# Patient Record
Sex: MALE | Race: WHITE | NOT HISPANIC OR LATINO
[De-identification: names, ages, dates, MRNs, and addresses within clinical notes are randomized per-mention and may not be internally consistent; named-entity substitution may affect disease eponyms.]

---

## 2017-01-13 ENCOUNTER — APPOINTMENT (OUTPATIENT)
Dept: INTERNAL MEDICINE | Facility: CLINIC | Age: 27
End: 2017-01-13

## 2017-01-13 VITALS
DIASTOLIC BLOOD PRESSURE: 72 MMHG | TEMPERATURE: 95.8 F | SYSTOLIC BLOOD PRESSURE: 118 MMHG | HEART RATE: 84 BPM | RESPIRATION RATE: 14 BRPM

## 2017-01-13 VITALS — HEIGHT: 65 IN | BODY MASS INDEX: 25.99 KG/M2 | WEIGHT: 156 LBS

## 2017-01-13 DIAGNOSIS — Z78.9 OTHER SPECIFIED HEALTH STATUS: ICD-10-CM

## 2017-01-13 DIAGNOSIS — Z83.2 FAMILY HISTORY OF DISEASES OF THE BLOOD AND BLOOD-FORMING ORGANS AND CERTAIN DISORDERS INVOLVING THE IMMUNE MECHANISM: ICD-10-CM

## 2017-01-13 DIAGNOSIS — Z86.39 PERSONAL HISTORY OF OTHER ENDOCRINE, NUTRITIONAL AND METABOLIC DISEASE: ICD-10-CM

## 2017-01-13 RX ORDER — MELOXICAM 15 MG/1
15 TABLET ORAL
Qty: 30 | Refills: 2 | Status: ACTIVE | COMMUNITY
Start: 2017-01-13 | End: 1900-01-01

## 2017-01-18 ENCOUNTER — APPOINTMENT (OUTPATIENT)
Dept: INTERNAL MEDICINE | Facility: CLINIC | Age: 27
End: 2017-01-18

## 2017-01-18 DIAGNOSIS — E78.2 MIXED HYPERLIPIDEMIA: ICD-10-CM

## 2017-01-18 DIAGNOSIS — E55.9 VITAMIN D DEFICIENCY, UNSPECIFIED: ICD-10-CM

## 2017-01-18 DIAGNOSIS — Z00.00 ENCOUNTER FOR GENERAL ADULT MEDICAL EXAMINATION W/OUT ABNORMAL FINDINGS: ICD-10-CM

## 2017-01-21 LAB
25(OH)D3 SERPL-MCNC: 35.9 NG/ML
ALBUMIN SERPL ELPH-MCNC: 4.7 G/DL
ALP BLD-CCNC: 46 U/L
ALT SERPL-CCNC: 24 U/L
ANION GAP SERPL CALC-SCNC: 14 MMOL/L
AST SERPL-CCNC: 20 U/L
BASOPHILS # BLD AUTO: 0.02 K/UL
BASOPHILS NFR BLD AUTO: 0.3 %
BILIRUB SERPL-MCNC: 0.4 MG/DL
BUN SERPL-MCNC: 19 MG/DL
CALCIUM SERPL-MCNC: 10.1 MG/DL
CHLORIDE SERPL-SCNC: 101 MMOL/L
CHOLEST SERPL-MCNC: 191 MG/DL
CHOLEST/HDLC SERPL: 3.5 RATIO
CO2 SERPL-SCNC: 27 MMOL/L
CREAT SERPL-MCNC: 1.12 MG/DL
EOSINOPHIL # BLD AUTO: 0.19 K/UL
EOSINOPHIL NFR BLD AUTO: 2.8 %
GLUCOSE SERPL-MCNC: 80 MG/DL
HCT VFR BLD CALC: 39.9 %
HDLC SERPL-MCNC: 54 MG/DL
HGB BLD-MCNC: 13.2 G/DL
IMM GRANULOCYTES NFR BLD AUTO: 0.1 %
LDLC SERPL CALC-MCNC: 125 MG/DL
LYMPHOCYTES # BLD AUTO: 2.61 K/UL
LYMPHOCYTES NFR BLD AUTO: 37.9 %
MAN DIFF?: NORMAL
MCHC RBC-ENTMCNC: 29.4 PG
MCHC RBC-ENTMCNC: 33.1 GM/DL
MCV RBC AUTO: 88.9 FL
MONOCYTES # BLD AUTO: 0.67 K/UL
MONOCYTES NFR BLD AUTO: 9.7 %
NEUTROPHILS # BLD AUTO: 3.39 K/UL
NEUTROPHILS NFR BLD AUTO: 49.2 %
PLATELET # BLD AUTO: 276 K/UL
POTASSIUM SERPL-SCNC: 4.4 MMOL/L
PROT SERPL-MCNC: 7.9 G/DL
RBC # BLD: 4.49 M/UL
RBC # FLD: 13.6 %
SODIUM SERPL-SCNC: 142 MMOL/L
TRIGL SERPL-MCNC: 59 MG/DL
WBC # FLD AUTO: 6.89 K/UL

## 2018-04-13 ENCOUNTER — APPOINTMENT (OUTPATIENT)
Dept: INTERNAL MEDICINE | Facility: CLINIC | Age: 28
End: 2018-04-13
Payer: COMMERCIAL

## 2018-04-13 VITALS
HEART RATE: 88 BPM | DIASTOLIC BLOOD PRESSURE: 72 MMHG | RESPIRATION RATE: 14 BRPM | SYSTOLIC BLOOD PRESSURE: 120 MMHG | TEMPERATURE: 99.3 F | WEIGHT: 150 LBS

## 2018-04-13 DIAGNOSIS — J02.0 STREPTOCOCCAL PHARYNGITIS: ICD-10-CM

## 2018-04-13 PROCEDURE — 99213 OFFICE O/P EST LOW 20 MIN: CPT

## 2018-04-13 RX ORDER — AZITHROMYCIN 250 MG/1
250 TABLET, FILM COATED ORAL
Qty: 6 | Refills: 0 | Status: ACTIVE | COMMUNITY
Start: 2018-04-13 | End: 1900-01-01

## 2018-04-17 LAB — BACTERIA THROAT CULT: NORMAL

## 2018-06-05 ENCOUNTER — EMERGENCY (EMERGENCY)
Facility: HOSPITAL | Age: 28
LOS: 1 days | Discharge: HOME | End: 2018-06-05
Attending: EMERGENCY MEDICINE
Payer: COMMERCIAL

## 2018-06-05 VITALS
TEMPERATURE: 98 F | RESPIRATION RATE: 16 BRPM | SYSTOLIC BLOOD PRESSURE: 135 MMHG | DIASTOLIC BLOOD PRESSURE: 88 MMHG | OXYGEN SATURATION: 100 % | HEART RATE: 52 BPM | WEIGHT: 154.98 LBS | HEIGHT: 66 IN

## 2018-06-05 LAB
APPEARANCE UR: CLEAR — SIGNIFICANT CHANGE UP
BILIRUB UR-MCNC: NEGATIVE — SIGNIFICANT CHANGE UP
COLOR SPEC: COLORLESS — SIGNIFICANT CHANGE UP
DIFF PNL FLD: ABNORMAL
GLUCOSE UR QL: NEGATIVE — SIGNIFICANT CHANGE UP
KETONES UR-MCNC: NEGATIVE — SIGNIFICANT CHANGE UP
LEUKOCYTE ESTERASE UR-ACNC: NEGATIVE — SIGNIFICANT CHANGE UP
NITRITE UR-MCNC: NEGATIVE — SIGNIFICANT CHANGE UP
PH UR: 7.5 — SIGNIFICANT CHANGE UP (ref 5–8)
PROT UR-MCNC: NEGATIVE — SIGNIFICANT CHANGE UP
RBC CASTS # UR COMP ASSIST: SIGNIFICANT CHANGE UP /HPF (ref 0–2)
SP GR SPEC: 1 — LOW (ref 1.01–1.02)
UROBILINOGEN FLD QL: NEGATIVE — SIGNIFICANT CHANGE UP
WBC UR QL: SIGNIFICANT CHANGE UP /HPF (ref 0–5)

## 2018-06-05 PROCEDURE — 99284 EMERGENCY DEPT VISIT MOD MDM: CPT

## 2018-06-05 PROCEDURE — 76770 US EXAM ABDO BACK WALL COMP: CPT | Mod: 26

## 2018-06-05 RX ORDER — ACETAMINOPHEN 500 MG
1000 TABLET ORAL ONCE
Qty: 0 | Refills: 0 | Status: COMPLETED | OUTPATIENT
Start: 2018-06-05 | End: 2018-06-05

## 2018-06-05 RX ORDER — SODIUM CHLORIDE 9 MG/ML
1000 INJECTION INTRAMUSCULAR; INTRAVENOUS; SUBCUTANEOUS ONCE
Qty: 0 | Refills: 0 | Status: COMPLETED | OUTPATIENT
Start: 2018-06-05 | End: 2018-06-05

## 2018-06-05 RX ADMIN — Medication 400 MILLIGRAM(S): at 23:51

## 2018-06-05 RX ADMIN — SODIUM CHLORIDE 1000 MILLILITER(S): 9 INJECTION INTRAMUSCULAR; INTRAVENOUS; SUBCUTANEOUS at 23:52

## 2018-06-05 NOTE — ED PROVIDER NOTE - PLAN OF CARE
Take motrin 600mg every 6- 8 hours as needed for pain. Or take Tylenol 975mg every 4-6 hours as needed for pain. Follow up with your primary care doctor in the next 1-2 days. Make an appt to be seen by the urologist. Return for any fever, chills, worsening pain.

## 2018-06-05 NOTE — ED PROVIDER NOTE - PROGRESS NOTE DETAILS
Dr. Avila: Pt signed out to me pending labs, suspected renal stone. Sono shows mild L hydro. Had hematuria. Dr. Avila: Pt signed out to me pending labs, suspected renal stone. Sono shows mild L hydro. Had hematuria earlier today. UA not concerning for infection. Dr. Avila: Results of labs and imaging reviewed with pt and parents at bedside. Feeling better. Advised to take motrin and or tylenol as needed for pain. Follow up with urology. Return precautions reviewed.

## 2018-06-05 NOTE — ED ADULT NURSE NOTE - OBJECTIVE STATEMENT
Pt had flank and back pain since this afternoon, he went to an urgicenter and was found to have blood in his urine.  He denies abimbola blood in his urine, fevers, vomiting or diarrhea.  Did have some nausea.

## 2018-06-05 NOTE — ED PROVIDER NOTE - ATTENDING CONTRIBUTION TO CARE
Clarissa Rubio MD - Attending Physician: I have personally seen and examined this patient with the resident/fellow.  I have fully participated in the care of this patient. I have reviewed all pertinent clinical information, including history, physical exam, plan and the Resident/Fellow’s note and agree except as noted. See MDM Clarissa Rubio MD - Attending Physician: I have personally seen and examined this patient. I have discussed the case with the ACP. I have reviewed all pertinent clinical information, including history, physical exam, plan and the ACP’s note and agree except as noted. See MDM

## 2018-06-05 NOTE — ED PROVIDER NOTE - CARE PLAN
Principal Discharge DX:	Flank pain  Assessment and plan of treatment:	Take motrin 600mg every 6- 8 hours as needed for pain. Or take Tylenol 975mg every 4-6 hours as needed for pain. Follow up with your primary care doctor in the next 1-2 days. Make an appt to be seen by the urologist. Return for any fever, chills, worsening pain.  Secondary Diagnosis:	Renal colic on left side

## 2018-06-05 NOTE — ED PROVIDER NOTE - MEDICAL DECISION MAKING DETAILS
Clarissa Rubio MD - Attending Physician: Pt here with flank pain, noted blood on UA at . C/w renal stone; however, pt very comfortable appearing now, exam normal and nonfocal. US for hydro, labs, Ua for eval

## 2018-06-05 NOTE — ED ADULT NURSE NOTE - CHPI ED SYMPTOMS NEG
no abdominal distension/no diarrhea/no dysuria/no fever/no blood in stool/no vomiting/no burning urination/no chills

## 2018-06-05 NOTE — ED PROVIDER NOTE - OBJECTIVE STATEMENT
26 yo male with no PMHx p/w left flank pain.  The patient reports that he developed sharp intermittent left flank pain this AM.  He took advil and pain improved.  Around 4pm pain returned.  Patient went to urgent care and UA was + for blood so he was sent to the ER.  Patient denies fevers/chills, CP, SOB, abd pain, NVDC, but does admit to urinary frequency.

## 2018-06-06 VITALS
HEART RATE: 67 BPM | OXYGEN SATURATION: 95 % | RESPIRATION RATE: 16 BRPM | SYSTOLIC BLOOD PRESSURE: 123 MMHG | DIASTOLIC BLOOD PRESSURE: 88 MMHG

## 2018-06-06 LAB
ALBUMIN SERPL ELPH-MCNC: 4.8 G/DL — SIGNIFICANT CHANGE UP (ref 3.3–5)
ALP SERPL-CCNC: 47 U/L — SIGNIFICANT CHANGE UP (ref 40–120)
ALT FLD-CCNC: 21 U/L — SIGNIFICANT CHANGE UP (ref 10–45)
ANION GAP SERPL CALC-SCNC: 13 MMOL/L — SIGNIFICANT CHANGE UP (ref 5–17)
AST SERPL-CCNC: 28 U/L — SIGNIFICANT CHANGE UP (ref 10–40)
BASE EXCESS BLDV CALC-SCNC: 5.1 MMOL/L — HIGH (ref -2–2)
BASOPHILS # BLD AUTO: 0.1 K/UL — SIGNIFICANT CHANGE UP (ref 0–0.2)
BASOPHILS NFR BLD AUTO: 1.3 % — SIGNIFICANT CHANGE UP (ref 0–2)
BILIRUB SERPL-MCNC: 0.5 MG/DL — SIGNIFICANT CHANGE UP (ref 0.2–1.2)
BUN SERPL-MCNC: 10 MG/DL — SIGNIFICANT CHANGE UP (ref 7–23)
CA-I SERPL-SCNC: 1.32 MMOL/L — HIGH (ref 1.12–1.3)
CALCIUM SERPL-MCNC: 10.6 MG/DL — HIGH (ref 8.4–10.5)
CHLORIDE BLDV-SCNC: 102 MMOL/L — SIGNIFICANT CHANGE UP (ref 96–108)
CHLORIDE SERPL-SCNC: 99 MMOL/L — SIGNIFICANT CHANGE UP (ref 96–108)
CO2 BLDV-SCNC: 33 MMOL/L — HIGH (ref 22–30)
CO2 SERPL-SCNC: 27 MMOL/L — SIGNIFICANT CHANGE UP (ref 22–31)
CREAT SERPL-MCNC: 1 MG/DL — SIGNIFICANT CHANGE UP (ref 0.5–1.3)
EOSINOPHIL # BLD AUTO: 0.2 K/UL — SIGNIFICANT CHANGE UP (ref 0–0.5)
EOSINOPHIL NFR BLD AUTO: 2.5 % — SIGNIFICANT CHANGE UP (ref 0–6)
GAS PNL BLDV: 140 MMOL/L — SIGNIFICANT CHANGE UP (ref 136–145)
GAS PNL BLDV: SIGNIFICANT CHANGE UP
GLUCOSE BLDV-MCNC: 93 MG/DL — SIGNIFICANT CHANGE UP (ref 70–99)
GLUCOSE SERPL-MCNC: 91 MG/DL — SIGNIFICANT CHANGE UP (ref 70–99)
HCO3 BLDV-SCNC: 31 MMOL/L — HIGH (ref 21–29)
HCT VFR BLD CALC: 38.3 % — LOW (ref 39–50)
HCT VFR BLDA CALC: 41 % — SIGNIFICANT CHANGE UP (ref 39–50)
HGB BLD CALC-MCNC: 13.3 G/DL — SIGNIFICANT CHANGE UP (ref 13–17)
HGB BLD-MCNC: 13.6 G/DL — SIGNIFICANT CHANGE UP (ref 13–17)
LACTATE BLDV-MCNC: 1.2 MMOL/L — SIGNIFICANT CHANGE UP (ref 0.7–2)
LYMPHOCYTES # BLD AUTO: 2.4 K/UL — SIGNIFICANT CHANGE UP (ref 1–3.3)
LYMPHOCYTES # BLD AUTO: 33.7 % — SIGNIFICANT CHANGE UP (ref 13–44)
MCHC RBC-ENTMCNC: 31.4 PG — SIGNIFICANT CHANGE UP (ref 27–34)
MCHC RBC-ENTMCNC: 35.5 GM/DL — SIGNIFICANT CHANGE UP (ref 32–36)
MCV RBC AUTO: 88.4 FL — SIGNIFICANT CHANGE UP (ref 80–100)
MONOCYTES # BLD AUTO: 0.5 K/UL — SIGNIFICANT CHANGE UP (ref 0–0.9)
MONOCYTES NFR BLD AUTO: 7.7 % — SIGNIFICANT CHANGE UP (ref 2–14)
NEUTROPHILS # BLD AUTO: 3.8 K/UL — SIGNIFICANT CHANGE UP (ref 1.8–7.4)
NEUTROPHILS NFR BLD AUTO: 54.7 % — SIGNIFICANT CHANGE UP (ref 43–77)
OTHER CELLS CSF MANUAL: 10 ML/DL — LOW (ref 18–22)
PCO2 BLDV: 53 MMHG — HIGH (ref 35–50)
PH BLDV: 7.38 — SIGNIFICANT CHANGE UP (ref 7.35–7.45)
PLATELET # BLD AUTO: 273 K/UL — SIGNIFICANT CHANGE UP (ref 150–400)
PO2 BLDV: 32 MMHG — SIGNIFICANT CHANGE UP (ref 25–45)
POTASSIUM BLDV-SCNC: 3.8 MMOL/L — SIGNIFICANT CHANGE UP (ref 3.5–5.3)
POTASSIUM SERPL-MCNC: 4.1 MMOL/L — SIGNIFICANT CHANGE UP (ref 3.5–5.3)
POTASSIUM SERPL-SCNC: 4.1 MMOL/L — SIGNIFICANT CHANGE UP (ref 3.5–5.3)
PROT SERPL-MCNC: 8.1 G/DL — SIGNIFICANT CHANGE UP (ref 6–8.3)
RBC # BLD: 4.33 M/UL — SIGNIFICANT CHANGE UP (ref 4.2–5.8)
RBC # FLD: 11.7 % — SIGNIFICANT CHANGE UP (ref 10.3–14.5)
SAO2 % BLDV: 56 % — LOW (ref 67–88)
SODIUM SERPL-SCNC: 139 MMOL/L — SIGNIFICANT CHANGE UP (ref 135–145)
WBC # BLD: 7 K/UL — SIGNIFICANT CHANGE UP (ref 3.8–10.5)
WBC # FLD AUTO: 7 K/UL — SIGNIFICANT CHANGE UP (ref 3.8–10.5)

## 2018-06-06 PROCEDURE — 96374 THER/PROPH/DIAG INJ IV PUSH: CPT

## 2018-06-06 PROCEDURE — 82947 ASSAY GLUCOSE BLOOD QUANT: CPT

## 2018-06-06 PROCEDURE — 76770 US EXAM ABDO BACK WALL COMP: CPT

## 2018-06-06 PROCEDURE — 85027 COMPLETE CBC AUTOMATED: CPT

## 2018-06-06 PROCEDURE — 83605 ASSAY OF LACTIC ACID: CPT

## 2018-06-06 PROCEDURE — 82330 ASSAY OF CALCIUM: CPT

## 2018-06-06 PROCEDURE — 80053 COMPREHEN METABOLIC PANEL: CPT

## 2018-06-06 PROCEDURE — 84295 ASSAY OF SERUM SODIUM: CPT

## 2018-06-06 PROCEDURE — 84132 ASSAY OF SERUM POTASSIUM: CPT

## 2018-06-06 PROCEDURE — 87086 URINE CULTURE/COLONY COUNT: CPT

## 2018-06-06 PROCEDURE — 82803 BLOOD GASES ANY COMBINATION: CPT

## 2018-06-06 PROCEDURE — 81001 URINALYSIS AUTO W/SCOPE: CPT

## 2018-06-06 PROCEDURE — 99284 EMERGENCY DEPT VISIT MOD MDM: CPT | Mod: 25

## 2018-06-06 PROCEDURE — 85014 HEMATOCRIT: CPT

## 2018-06-06 PROCEDURE — 82435 ASSAY OF BLOOD CHLORIDE: CPT

## 2018-06-06 RX ADMIN — Medication 1000 MILLIGRAM(S): at 00:45

## 2018-06-07 ENCOUNTER — APPOINTMENT (OUTPATIENT)
Dept: INTERNAL MEDICINE | Facility: CLINIC | Age: 28
End: 2018-06-07

## 2018-06-07 LAB
CULTURE RESULTS: NO GROWTH — SIGNIFICANT CHANGE UP
SPECIMEN SOURCE: SIGNIFICANT CHANGE UP

## 2018-06-11 ENCOUNTER — OUTPATIENT (OUTPATIENT)
Dept: OUTPATIENT SERVICES | Facility: HOSPITAL | Age: 28
LOS: 1 days | End: 2018-06-11
Payer: COMMERCIAL

## 2018-06-11 ENCOUNTER — APPOINTMENT (OUTPATIENT)
Dept: UROLOGY | Facility: CLINIC | Age: 28
End: 2018-06-11
Payer: COMMERCIAL

## 2018-06-11 VITALS
SYSTOLIC BLOOD PRESSURE: 115 MMHG | BODY MASS INDEX: 24.91 KG/M2 | HEART RATE: 52 BPM | TEMPERATURE: 98.2 F | DIASTOLIC BLOOD PRESSURE: 75 MMHG | WEIGHT: 155 LBS | HEIGHT: 66 IN | RESPIRATION RATE: 16 BRPM

## 2018-06-11 DIAGNOSIS — N13.30 UNSPECIFIED HYDRONEPHROSIS: ICD-10-CM

## 2018-06-11 DIAGNOSIS — N20.0 CALCULUS OF KIDNEY: ICD-10-CM

## 2018-06-11 PROCEDURE — 76775 US EXAM ABDO BACK WALL LIM: CPT

## 2018-06-11 PROCEDURE — 99244 OFF/OP CNSLTJ NEW/EST MOD 40: CPT | Mod: 25

## 2018-06-11 PROCEDURE — 76775 US EXAM ABDO BACK WALL LIM: CPT | Mod: 26

## 2018-06-12 DIAGNOSIS — N13.30 UNSPECIFIED HYDRONEPHROSIS: ICD-10-CM

## 2018-06-12 DIAGNOSIS — N20.0 CALCULUS OF KIDNEY: ICD-10-CM

## 2018-06-16 LAB
APPEARANCE: CLEAR
BACTERIA UR CULT: NORMAL
BACTERIA: NEGATIVE
BILIRUBIN URINE: NEGATIVE
BLOOD URINE: NEGATIVE
COLOR: YELLOW
CORE LAB FLUID CYTOLOGY: NORMAL
GLUCOSE QUALITATIVE U: NEGATIVE MG/DL
KETONES URINE: NEGATIVE
LEUKOCYTE ESTERASE URINE: NEGATIVE
MICROSCOPIC-UA: NORMAL
NITRITE URINE: NEGATIVE
PH URINE: 6.5
PROTEIN URINE: NEGATIVE MG/DL
RED BLOOD CELLS URINE: 0 /HPF
SPECIFIC GRAVITY URINE: 1.01
SQUAMOUS EPITHELIAL CELLS: 0 /HPF
UROBILINOGEN URINE: NEGATIVE MG/DL
WHITE BLOOD CELLS URINE: 0 /HPF

## 2018-09-10 ENCOUNTER — APPOINTMENT (OUTPATIENT)
Dept: UROLOGY | Facility: CLINIC | Age: 28
End: 2018-09-10

## 2020-12-16 PROBLEM — J02.0 PHARYNGITIS DUE TO STREPTOCOCCUS SPECIES: Status: RESOLVED | Noted: 2018-04-13 | Resolved: 2020-12-16

## 2020-12-30 ENCOUNTER — EMERGENCY (EMERGENCY)
Facility: HOSPITAL | Age: 30
LOS: 1 days | Discharge: HOME | End: 2020-12-30
Admitting: EMERGENCY MEDICINE
Payer: COMMERCIAL

## 2020-12-30 VITALS
TEMPERATURE: 99 F | HEIGHT: 66 IN | HEART RATE: 77 BPM | DIASTOLIC BLOOD PRESSURE: 76 MMHG | OXYGEN SATURATION: 96 % | SYSTOLIC BLOOD PRESSURE: 123 MMHG | RESPIRATION RATE: 18 BRPM

## 2020-12-30 DIAGNOSIS — Z20.828 CONTACT WITH AND (SUSPECTED) EXPOSURE TO OTHER VIRAL COMMUNICABLE DISEASES: ICD-10-CM

## 2020-12-30 PROCEDURE — U0003: CPT

## 2020-12-30 PROCEDURE — 99283 EMERGENCY DEPT VISIT LOW MDM: CPT

## 2020-12-30 NOTE — ED PROVIDER NOTE - NSFOLLOWUPINSTRUCTIONS_ED_ALL_ED_FT
You will received a text or email with your covid swab results within 24-48 hours.  You can also call back the number [email or web address] on discharge papers for results or access patient portal.    If you have symptoms of covid 19 or were exposed you should quarantine for 14 days minimum or until symptoms resolve  If you have difficulty breathing, chest pain, dizziness, fainting, vomiting or other concerns return to ED

## 2020-12-30 NOTE — ED PROVIDER NOTE - PATIENT PORTAL LINK FT
You can access the FollowMyHealth Patient Portal offered by [facility] by registering at the following website: [email or web address]. By joining [employer]’s FollowMyHealth portal, you will also be able to view your health information using other applications (apps) compatible with our system.

## 2020-12-31 LAB — SARS-COV-2 RNA SPEC QL NAA+PROBE: SIGNIFICANT CHANGE UP

## 2022-06-20 NOTE — ED ADULT TRIAGE NOTE - WEIGHT IN KG
Post-Op Assessment Note    CV Status:  Stable  Pain Score: 1    Pain management: adequate     Mental Status:  Alert and awake   Hydration Status:  Euvolemic   PONV Controlled:  Controlled   Airway Patency:  Patent      Post Op Vitals Reviewed: Yes      Staff: Anesthesiologist         No complications documented      /63 (06/20/22 1216)    Temp (!) 97 2 °F (36 2 °C) (06/20/22 1216)    Pulse 66 (06/20/22 1216)   Resp 13 (06/20/22 1216)    SpO2 95 % (06/20/22 1216)
70.3

## 2022-07-15 ENCOUNTER — OUTPATIENT (OUTPATIENT)
Dept: OUTPATIENT SERVICES | Facility: HOSPITAL | Age: 32
LOS: 1 days | End: 2022-07-15
Payer: COMMERCIAL

## 2022-07-15 DIAGNOSIS — Z11.52 ENCOUNTER FOR SCREENING FOR COVID-19: ICD-10-CM

## 2022-07-15 LAB — SARS-COV-2 RNA SPEC QL NAA+PROBE: SIGNIFICANT CHANGE UP

## 2022-07-15 PROCEDURE — U0005: CPT

## 2022-07-15 PROCEDURE — U0003: CPT

## 2022-07-15 PROCEDURE — C9803: CPT

## 2022-09-01 NOTE — ED PROVIDER NOTE - PMH
Keep dental and pcp/psych appointments as scheduled. Follow up with us prn. Tylenol/motrin for pain.   No pertinent past medical history

## 2023-08-07 ENCOUNTER — NON-APPOINTMENT (OUTPATIENT)
Age: 33
End: 2023-08-07

## 2023-10-03 ENCOUNTER — NON-APPOINTMENT (OUTPATIENT)
Age: 33
End: 2023-10-03

## 2023-10-03 ENCOUNTER — APPOINTMENT (OUTPATIENT)
Dept: INTERNAL MEDICINE | Facility: CLINIC | Age: 33
End: 2023-10-03
Payer: COMMERCIAL

## 2023-10-03 VITALS
WEIGHT: 168.31 LBS | OXYGEN SATURATION: 97 % | HEART RATE: 66 BPM | TEMPERATURE: 97.5 F | RESPIRATION RATE: 16 BRPM | SYSTOLIC BLOOD PRESSURE: 118 MMHG | HEIGHT: 66 IN | BODY MASS INDEX: 27.05 KG/M2 | DIASTOLIC BLOOD PRESSURE: 83 MMHG

## 2023-10-03 DIAGNOSIS — Z00.00 ENCOUNTER FOR GENERAL ADULT MEDICAL EXAMINATION W/OUT ABNORMAL FINDINGS: ICD-10-CM

## 2023-10-03 DIAGNOSIS — Z77.011 CONTACT WITH AND (SUSPECTED) EXPOSURE TO LEAD: ICD-10-CM

## 2023-10-03 PROCEDURE — 36415 COLL VENOUS BLD VENIPUNCTURE: CPT

## 2023-10-03 PROCEDURE — 99385 PREV VISIT NEW AGE 18-39: CPT | Mod: 25

## 2023-10-05 ENCOUNTER — TRANSCRIPTION ENCOUNTER (OUTPATIENT)
Age: 33
End: 2023-10-05

## 2023-10-10 ENCOUNTER — NON-APPOINTMENT (OUTPATIENT)
Age: 33
End: 2023-10-10

## 2023-10-10 LAB
25(OH)D3 SERPL-MCNC: 32.4 NG/ML
ALBUMIN SERPL ELPH-MCNC: 5 G/DL
ALP BLD-CCNC: 48 U/L
ALT SERPL-CCNC: 30 U/L
ANION GAP SERPL CALC-SCNC: 11 MMOL/L
APPEARANCE: CLEAR
AST SERPL-CCNC: 23 U/L
BASOPHILS # BLD AUTO: 0.05 K/UL
BASOPHILS NFR BLD AUTO: 1 %
BILIRUB SERPL-MCNC: 0.5 MG/DL
BILIRUBIN URINE: NEGATIVE
BLOOD URINE: NEGATIVE
BUN SERPL-MCNC: 16 MG/DL
CALCIUM SERPL-MCNC: 10.3 MG/DL
CHLORIDE SERPL-SCNC: 102 MMOL/L
CHOLEST SERPL-MCNC: 204 MG/DL
CO2 SERPL-SCNC: 28 MMOL/L
COLOR: YELLOW
CREAT SERPL-MCNC: 1.01 MG/DL
EGFR: 103 ML/MIN/1.73M2
EOSINOPHIL # BLD AUTO: 0.16 K/UL
EOSINOPHIL NFR BLD AUTO: 3.2 %
ESTIMATED AVERAGE GLUCOSE: 100 MG/DL
GLUCOSE QUALITATIVE U: NEGATIVE MG/DL
GLUCOSE SERPL-MCNC: 92 MG/DL
HBA1C MFR BLD HPLC: 5.1 %
HCT VFR BLD CALC: 39.5 %
HDLC SERPL-MCNC: 51 MG/DL
HGB BLD-MCNC: 13.2 G/DL
IMM GRANULOCYTES NFR BLD AUTO: 0.4 %
KETONES URINE: NEGATIVE MG/DL
LDLC SERPL CALC-MCNC: 142 MG/DL
LEAD BLD-MCNC: 1.4 UG/DL
LEUKOCYTE ESTERASE URINE: NEGATIVE
LYMPHOCYTES # BLD AUTO: 1.6 K/UL
LYMPHOCYTES NFR BLD AUTO: 32 %
MAN DIFF?: NORMAL
MCHC RBC-ENTMCNC: 28.4 PG
MCHC RBC-ENTMCNC: 33.4 GM/DL
MCV RBC AUTO: 85.1 FL
MONOCYTES # BLD AUTO: 0.46 K/UL
MONOCYTES NFR BLD AUTO: 9.2 %
NEUTROPHILS # BLD AUTO: 2.71 K/UL
NEUTROPHILS NFR BLD AUTO: 54.2 %
NITRITE URINE: NEGATIVE
NONHDLC SERPL-MCNC: 153 MG/DL
PH URINE: 7
PLATELET # BLD AUTO: 285 K/UL
POTASSIUM SERPL-SCNC: 4.2 MMOL/L
PROT SERPL-MCNC: 8 G/DL
PROTEIN URINE: NEGATIVE MG/DL
RBC # BLD: 4.64 M/UL
RBC # FLD: 13.2 %
SODIUM SERPL-SCNC: 141 MMOL/L
SPECIFIC GRAVITY URINE: 1.02
TRIGL SERPL-MCNC: 60 MG/DL
TSH SERPL-ACNC: 1.41 UIU/ML
UROBILINOGEN URINE: 0.2 MG/DL
VIT B12 SERPL-MCNC: 410 PG/ML
WBC # FLD AUTO: 5 K/UL

## 2024-04-29 ENCOUNTER — APPOINTMENT (OUTPATIENT)
Dept: INTERNAL MEDICINE | Facility: CLINIC | Age: 34
End: 2024-04-29
Payer: COMMERCIAL

## 2024-04-29 VITALS
WEIGHT: 170 LBS | BODY MASS INDEX: 27.32 KG/M2 | DIASTOLIC BLOOD PRESSURE: 72 MMHG | HEART RATE: 54 BPM | HEIGHT: 66 IN | SYSTOLIC BLOOD PRESSURE: 112 MMHG | OXYGEN SATURATION: 99 % | TEMPERATURE: 97.2 F

## 2024-04-29 DIAGNOSIS — M79.673 PAIN IN UNSPECIFIED FOOT: ICD-10-CM

## 2024-04-29 DIAGNOSIS — M77.8 OTHER ENTHESOPATHIES, NOT ELSEWHERE CLASSIFIED: ICD-10-CM

## 2024-04-29 DIAGNOSIS — R21 RASH AND OTHER NONSPECIFIC SKIN ERUPTION: ICD-10-CM

## 2024-04-29 DIAGNOSIS — S29.011A STRAIN OF MUSCLE AND TENDON OF FRONT WALL OF THORAX, INITIAL ENCOUNTER: ICD-10-CM

## 2024-04-29 PROCEDURE — 99213 OFFICE O/P EST LOW 20 MIN: CPT

## 2024-04-29 NOTE — PHYSICAL EXAM
[Normal] : soft, non-tender, non-distended, no masses palpated, no HSM and normal bowel sounds [de-identified] : tenderness over subacromion bursa ,coracoid process, empty can test positive, tenderness over 4th and 5th base metatarsals [de-identified] : erythematous papules with excoriation, erythematous patch over left anterior mid-thigh

## 2024-04-29 NOTE — HISTORY OF PRESENT ILLNESS
[FreeTextEntry1] : Pt presents for follow-up. [de-identified] : Patient c/o right shoulder pain from an old injury with no recollection of initial injury, pt reports possibly from working out. Pt reports pain used to go away with a flare-up in past yet not subsiding now and pt has constant ache. Pt reports lifting 25 lb son often and yard work such as shoveling. Denies pain while sleeping on right, denies radiating of pain. Pt does report some neck pain from sleeping yet not associated with shoulder. Pt reports feeling tired quicker. Pt experiences pain while raising right arm overhead as well as chest wall discomfort.   Pt reports pain under left ankle that shoots up. 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 Pt denies back pain that radiates down left leg.   Pt also reports of a rash on lateral left mid-thigh. Pt reports the rash has elevated bumps, which are itchy, and have scabbed over. Pt describes rash as small "bites". Pt reports wearing long pants while doing yard work. Pt reports maybe sweating.   Denies any CP, chest tightness or SOB. Denies any abdominal pain, urinary symptom, or change in bowel habits. Denies any fever, chills, or night sweats.

## 2024-04-29 NOTE — ADDENDUM
[FreeTextEntry1] : I, Ruchi Sandoval, acted as a scribe on behalf of Dr. Raymon Simms MD, on 04/29/2024.   All medical entries made by the scribe were at my, Dr. Raymon Simms MD, direction and personally dictated by me on 04/29/2024. I have reviewed the chart and agree that the record accurately reflects my personal performance of the history, physical exam, assessment and plan. I have also personally directed, reviewed, and agreed with the chart.

## 2024-05-08 ENCOUNTER — APPOINTMENT (OUTPATIENT)
Dept: ULTRASOUND IMAGING | Facility: CLINIC | Age: 34
End: 2024-05-08
Payer: COMMERCIAL

## 2024-05-08 ENCOUNTER — RESULT REVIEW (OUTPATIENT)
Age: 34
End: 2024-05-08

## 2024-05-08 ENCOUNTER — OUTPATIENT (OUTPATIENT)
Dept: OUTPATIENT SERVICES | Facility: HOSPITAL | Age: 34
LOS: 1 days | End: 2024-05-08
Payer: COMMERCIAL

## 2024-05-08 ENCOUNTER — APPOINTMENT (OUTPATIENT)
Dept: RADIOLOGY | Facility: CLINIC | Age: 34
End: 2024-05-08
Payer: COMMERCIAL

## 2024-05-08 DIAGNOSIS — M79.673 PAIN IN UNSPECIFIED FOOT: ICD-10-CM

## 2024-05-08 DIAGNOSIS — M77.8 OTHER ENTHESOPATHIES, NOT ELSEWHERE CLASSIFIED: ICD-10-CM

## 2024-05-08 PROCEDURE — 76882 US LMTD JT/FCL EVL NVASC XTR: CPT | Mod: 26,RT

## 2024-05-08 PROCEDURE — 73030 X-RAY EXAM OF SHOULDER: CPT | Mod: 26,RT

## 2024-05-08 PROCEDURE — 73030 X-RAY EXAM OF SHOULDER: CPT

## 2024-05-08 PROCEDURE — 73630 X-RAY EXAM OF FOOT: CPT | Mod: 26,LT

## 2024-05-08 PROCEDURE — 73630 X-RAY EXAM OF FOOT: CPT

## 2024-05-08 PROCEDURE — 76882 US LMTD JT/FCL EVL NVASC XTR: CPT

## 2024-05-09 ENCOUNTER — APPOINTMENT (OUTPATIENT)
Dept: RADIOLOGY | Facility: CLINIC | Age: 34
End: 2024-05-09

## 2024-05-14 ENCOUNTER — NON-APPOINTMENT (OUTPATIENT)
Age: 34
End: 2024-05-14